# Patient Record
Sex: FEMALE | Race: OTHER | NOT HISPANIC OR LATINO
[De-identification: names, ages, dates, MRNs, and addresses within clinical notes are randomized per-mention and may not be internally consistent; named-entity substitution may affect disease eponyms.]

---

## 2022-04-11 PROBLEM — Z00.00 ENCOUNTER FOR PREVENTIVE HEALTH EXAMINATION: Status: ACTIVE | Noted: 2022-04-11

## 2022-04-19 ENCOUNTER — APPOINTMENT (OUTPATIENT)
Dept: OBGYN | Facility: CLINIC | Age: 47
End: 2022-04-19
Payer: COMMERCIAL

## 2022-04-19 VITALS
WEIGHT: 218 LBS | DIASTOLIC BLOOD PRESSURE: 90 MMHG | HEIGHT: 66 IN | BODY MASS INDEX: 35.03 KG/M2 | SYSTOLIC BLOOD PRESSURE: 136 MMHG

## 2022-04-19 DIAGNOSIS — Z83.3 FAMILY HISTORY OF DIABETES MELLITUS: ICD-10-CM

## 2022-04-19 DIAGNOSIS — Z78.9 OTHER SPECIFIED HEALTH STATUS: ICD-10-CM

## 2022-04-19 DIAGNOSIS — Z80.1 FAMILY HISTORY OF MALIGNANT NEOPLASM OF TRACHEA, BRONCHUS AND LUNG: ICD-10-CM

## 2022-04-19 DIAGNOSIS — Z82.49 FAMILY HISTORY OF ISCHEMIC HEART DISEASE AND OTHER DISEASES OF THE CIRCULATORY SYSTEM: ICD-10-CM

## 2022-04-19 PROCEDURE — 99072 ADDL SUPL MATRL&STAF TM PHE: CPT

## 2022-04-19 PROCEDURE — 99386 PREV VISIT NEW AGE 40-64: CPT

## 2022-04-19 NOTE — HISTORY OF PRESENT ILLNESS
[FreeTextEntry1] : Patient is 47 years old para 2-0-0-2 last menstrual period April 1 2022 x 5 days.\par She notes regular menstrual periods every month and is present without complaints.

## 2022-04-19 NOTE — DISCUSSION/SUMMARY
[FreeTextEntry1] : Pap, gonorrhea and Chlamydia test done\par Self breast exam stressed\par Prescribed bilateral diagnostic mammogram and bilateral breast ultrasound\par Follow-up yearly or as needed

## 2022-05-23 ENCOUNTER — APPOINTMENT (OUTPATIENT)
Dept: OTOLARYNGOLOGY | Facility: CLINIC | Age: 47
End: 2022-05-23
Payer: COMMERCIAL

## 2022-05-23 VITALS — BODY MASS INDEX: 34.55 KG/M2 | WEIGHT: 215 LBS | HEIGHT: 66 IN

## 2022-05-23 DIAGNOSIS — R07.0 PAIN IN THROAT: ICD-10-CM

## 2022-05-23 DIAGNOSIS — R22.1 LOCALIZED SWELLING, MASS AND LUMP, NECK: ICD-10-CM

## 2022-05-23 DIAGNOSIS — K21.9 GASTRO-ESOPHAGEAL REFLUX DISEASE W/OUT ESOPHAGITIS: ICD-10-CM

## 2022-05-23 PROCEDURE — 31575 DIAGNOSTIC LARYNGOSCOPY: CPT

## 2022-05-23 PROCEDURE — 99204 OFFICE O/P NEW MOD 45 MIN: CPT | Mod: 25

## 2022-05-23 PROCEDURE — 99072 ADDL SUPL MATRL&STAF TM PHE: CPT

## 2022-05-23 RX ORDER — PANTOPRAZOLE 40 MG/1
40 TABLET, DELAYED RELEASE ORAL
Qty: 30 | Refills: 3 | Status: ACTIVE | COMMUNITY
Start: 2022-05-23 | End: 1900-01-01

## 2022-05-23 RX ORDER — FAMOTIDINE 40 MG/1
40 TABLET, FILM COATED ORAL
Qty: 30 | Refills: 3 | Status: ACTIVE | COMMUNITY
Start: 2022-05-23 | End: 1900-01-01

## 2022-05-23 NOTE — HISTORY OF PRESENT ILLNESS
[de-identified] : Patient presents today c/o throat pain ,seen PCP for throat  pain referred to ENT  . She did have sonogram performed , feels a mass behind neck  .  No  discomfort  when swallowing.   Has been having throat pain  since 2020, on and off .

## 2022-05-23 NOTE — PHYSICAL EXAM
[Normal] : no abnormal secretions [de-identified] : palpable mass soft non tender supraclavicular area at left

## 2022-05-23 NOTE — PROCEDURE
[None] : none [Flexible Endoscope] : examined with the flexible endoscope [True Vocal Cords Erythematous] : bilateral true vocal cord edema [Glottis Arytenoid Cartilages Erythema] : bilateral arytenoid ~M erythema [Arytenoid Erythema ___ /4] : arytenoid erythema [unfilled]U/4 [Normal] : posterior cricoid area had healthy pink mucosa in the interarytenoid area and the esophageal inlet [Interarytenoid Edema] : interarytenoid area edematous

## 2023-05-15 ENCOUNTER — APPOINTMENT (OUTPATIENT)
Dept: OBGYN | Facility: CLINIC | Age: 48
End: 2023-05-15
Payer: COMMERCIAL

## 2023-05-15 VITALS
SYSTOLIC BLOOD PRESSURE: 122 MMHG | HEIGHT: 66 IN | BODY MASS INDEX: 34.23 KG/M2 | WEIGHT: 213 LBS | DIASTOLIC BLOOD PRESSURE: 76 MMHG

## 2023-05-15 DIAGNOSIS — Z01.419 ENCOUNTER FOR GYNECOLOGICAL EXAMINATION (GENERAL) (ROUTINE) W/OUT ABNORMAL FINDINGS: ICD-10-CM

## 2023-05-15 PROCEDURE — 99396 PREV VISIT EST AGE 40-64: CPT

## 2023-05-15 NOTE — HISTORY OF PRESENT ILLNESS
[FreeTextEntry1] : Patient is 48 years old para 2-0-0-2 last menstrual period May 8, 2023.\par She notes regular menstrual periods every month and is present without complaints

## 2023-11-28 ENCOUNTER — APPOINTMENT (OUTPATIENT)
Dept: ORTHOPEDIC SURGERY | Facility: CLINIC | Age: 48
End: 2023-11-28
Payer: COMMERCIAL

## 2023-11-28 PROCEDURE — 99203 OFFICE O/P NEW LOW 30 MIN: CPT

## 2023-11-28 RX ORDER — NAPROXEN 500 MG/1
500 TABLET ORAL
Qty: 60 | Refills: 2 | Status: ACTIVE | COMMUNITY
Start: 2023-11-28 | End: 1900-01-01

## 2024-02-01 ENCOUNTER — RX RENEWAL (OUTPATIENT)
Age: 49
End: 2024-02-01

## 2024-02-01 RX ORDER — FAMOTIDINE 20 MG/1
20 TABLET, FILM COATED ORAL
Qty: 60 | Refills: 3 | Status: ACTIVE | COMMUNITY
Start: 2023-11-28 | End: 1900-01-01

## 2024-02-06 ENCOUNTER — APPOINTMENT (OUTPATIENT)
Dept: ORTHOPEDIC SURGERY | Facility: CLINIC | Age: 49
End: 2024-02-06

## 2024-02-20 ENCOUNTER — APPOINTMENT (OUTPATIENT)
Dept: GASTROENTEROLOGY | Facility: CLINIC | Age: 49
End: 2024-02-20

## 2024-04-18 ENCOUNTER — APPOINTMENT (OUTPATIENT)
Dept: ORTHOPEDIC SURGERY | Facility: CLINIC | Age: 49
End: 2024-04-18
Payer: MEDICAID

## 2024-04-18 DIAGNOSIS — G89.29 PAIN IN LEFT KNEE: ICD-10-CM

## 2024-04-18 DIAGNOSIS — M25.562 PAIN IN LEFT KNEE: ICD-10-CM

## 2024-04-18 PROCEDURE — 99213 OFFICE O/P EST LOW 20 MIN: CPT

## 2024-04-18 RX ORDER — FAMOTIDINE 20 MG/1
20 TABLET, FILM COATED ORAL
Qty: 60 | Refills: 2 | Status: ACTIVE | COMMUNITY
Start: 2024-04-18 | End: 1900-01-01

## 2024-04-18 RX ORDER — NAPROXEN 500 MG/1
500 TABLET ORAL
Qty: 60 | Refills: 1 | Status: ACTIVE | COMMUNITY
Start: 2024-04-18 | End: 1900-01-01

## 2024-04-18 NOTE — ASSESSMENT
[FreeTextEntry1] : 49-year-old woman with patellofemoral arthritis and patella tilt.  I do think that she should complete a course of physical therapy.  She can continue with the Cisneros taping.  I have renewed a prescription for Naprosyn and famotidine.  NSAID precautions discussed.  In addition to these strategies we can consider hyaluronic acid injection.  She is amenable.  Will seek approval.  Will have her follow-up in 3 months time after she completed course of physical therapy.  New PT prescription provided.  Instructions on terminal extension quadricep strengthening were also reviewed with the patient.  All questions answered to her satisfaction she agrees with the plan.

## 2024-04-18 NOTE — HISTORY OF PRESENT ILLNESS
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  Prescribed physical therapy she had some insurance issues and was unable to complete the therapy.  She has been using Kinesiotape which she finds helpful.  She still has pain 2-3 times a week for which she takes Naprosyn with famotidine.  This is medicated but not alleviated the pain.  She wonders what more can be done for her knee pain.  Walks without a cane or assistive devices.  No history of giving way or locking.

## 2024-04-18 NOTE — IMAGING
[de-identified] : Pleasant early middle-aged woman walks into my office no distress.  She is able to get onto and subsequently off of the exam table without assistance.  Physical examination: Left knee: Abnormal patellofemoral grind test.  She has a slight patella tilt.  J sign is much less pronounced.  No joint line tenderness between the femoral-tibial joint.  Moderate synovial thickening.  No effusion.  No varus valgus instability.  No geniculate lymph nodes or masses.  Calf soft no cords.

## 2024-06-24 ENCOUNTER — APPOINTMENT (OUTPATIENT)
Dept: OBGYN | Facility: CLINIC | Age: 49
End: 2024-06-24
Payer: MEDICAID

## 2024-06-24 VITALS
WEIGHT: 195 LBS | BODY MASS INDEX: 31.34 KG/M2 | HEIGHT: 66 IN | DIASTOLIC BLOOD PRESSURE: 81 MMHG | SYSTOLIC BLOOD PRESSURE: 127 MMHG

## 2024-06-24 DIAGNOSIS — N95.2 POSTMENOPAUSAL ATROPHIC VAGINITIS: ICD-10-CM

## 2024-06-24 DIAGNOSIS — Z01.411 ENCOUNTER FOR GYNECOLOGICAL EXAMINATION (GENERAL) (ROUTINE) WITH ABNORMAL FINDINGS: ICD-10-CM

## 2024-06-24 DIAGNOSIS — N84.1 POLYP OF CERVIX UTERI: ICD-10-CM

## 2024-06-24 PROCEDURE — 99213 OFFICE O/P EST LOW 20 MIN: CPT | Mod: 25

## 2024-06-24 PROCEDURE — 99396 PREV VISIT EST AGE 40-64: CPT

## 2024-06-24 PROCEDURE — 57500 BIOPSY OF CERVIX: CPT

## 2024-07-16 ENCOUNTER — APPOINTMENT (OUTPATIENT)
Dept: ORTHOPEDIC SURGERY | Facility: CLINIC | Age: 49
End: 2024-07-16

## 2024-07-23 DIAGNOSIS — Z80.0 FAMILY HISTORY OF MALIGNANT NEOPLASM OF DIGESTIVE ORGANS: ICD-10-CM

## 2024-07-23 DIAGNOSIS — Z12.11 ENCOUNTER FOR SCREENING FOR MALIGNANT NEOPLASM OF COLON: ICD-10-CM

## 2024-08-06 ENCOUNTER — APPOINTMENT (OUTPATIENT)
Dept: GASTROENTEROLOGY | Facility: CLINIC | Age: 49
End: 2024-08-06

## 2024-08-06 PROBLEM — Z12.11 COLON CANCER SCREENING: Status: ACTIVE | Noted: 2024-08-06

## 2024-08-06 PROCEDURE — 99204 OFFICE O/P NEW MOD 45 MIN: CPT

## 2024-08-06 NOTE — PHYSICAL EXAM
[Normal] : the appearance was normal, no neck mass [No Respiratory Distress] : no respiratory distress [Respiration, Rhythm And Depth] : normal respiratory rhythm and effort [Auscultation Breath Sounds / Voice Sounds] : lungs were clear to auscultation bilaterally [Heart Rate And Rhythm] : heart rate was normal and rhythm regular [Normal S1, S2] : normal S1 and S2 [Bowel Sounds] : normal bowel sounds [Murmurs] : no murmurs [Abdomen Tenderness] : non-tender [No Masses] : no abdominal mass palpated [Abdomen Soft] : soft [Abnormal Walk] : normal gait [Oriented To Time, Place, And Person] : oriented to person, place, and time

## 2024-08-06 NOTE — HISTORY OF PRESENT ILLNESS
[FreeTextEntry1] : 49yoF average risk for CRC. Patient has no significant medical history. Presents today for CRC screening.   Pt denies abd pain, no nausea or vomiting, no dysphagia or odynophagia, no heartburn, no unintentional weight loss or appetite changes, no diarrhea, no constipation or changes in BMs including hematochezia or melena, no coffee ground emesis or hematemesis. States she has a BM 1x/day with no straining.

## 2024-08-06 NOTE — ASSESSMENT
[FreeTextEntry1] : 49yoF average risk for CRC. Patient has no significant medical history. Presents today for CRC screening.   CRC Screening -Schedule colonoscopy -educated on the generic suprep and dulcolax -risks vs. benefits discussed

## 2024-08-06 NOTE — PHYSICAL EXAM
[Normal] : the appearance was normal, no neck mass [No Respiratory Distress] : no respiratory distress [Respiration, Rhythm And Depth] : normal respiratory rhythm and effort [Auscultation Breath Sounds / Voice Sounds] : lungs were clear to auscultation bilaterally [Heart Rate And Rhythm] : heart rate was normal and rhythm regular [Normal S1, S2] : normal S1 and S2 [Murmurs] : no murmurs [Bowel Sounds] : normal bowel sounds [Abdomen Tenderness] : non-tender [No Masses] : no abdominal mass palpated [Abdomen Soft] : soft [Abnormal Walk] : normal gait [Oriented To Time, Place, And Person] : oriented to person, place, and time

## 2024-08-13 ENCOUNTER — RX RENEWAL (OUTPATIENT)
Age: 49
End: 2024-08-13

## 2025-06-25 ENCOUNTER — TRANSCRIPTION ENCOUNTER (OUTPATIENT)
Age: 50
End: 2025-06-25

## 2025-06-25 ENCOUNTER — RESULT REVIEW (OUTPATIENT)
Age: 50
End: 2025-06-25

## 2025-06-25 ENCOUNTER — OUTPATIENT (OUTPATIENT)
Dept: OUTPATIENT SERVICES | Facility: HOSPITAL | Age: 50
LOS: 1 days | Discharge: HOME | End: 2025-06-25
Payer: COMMERCIAL

## 2025-06-25 VITALS
WEIGHT: 195.11 LBS | RESPIRATION RATE: 17 BRPM | TEMPERATURE: 98 F | SYSTOLIC BLOOD PRESSURE: 137 MMHG | OXYGEN SATURATION: 100 % | HEART RATE: 75 BPM | HEIGHT: 65 IN | DIASTOLIC BLOOD PRESSURE: 74 MMHG

## 2025-06-25 VITALS — HEART RATE: 62 BPM | SYSTOLIC BLOOD PRESSURE: 140 MMHG | RESPIRATION RATE: 16 BRPM | DIASTOLIC BLOOD PRESSURE: 63 MMHG

## 2025-06-25 DIAGNOSIS — Z12.11 ENCOUNTER FOR SCREENING FOR MALIGNANT NEOPLASM OF COLON: ICD-10-CM

## 2025-06-25 PROCEDURE — 81025 URINE PREGNANCY TEST: CPT

## 2025-06-25 PROCEDURE — 88305 TISSUE EXAM BY PATHOLOGIST: CPT

## 2025-06-25 PROCEDURE — 88305 TISSUE EXAM BY PATHOLOGIST: CPT | Mod: 26

## 2025-06-25 PROCEDURE — 45380 COLONOSCOPY AND BIOPSY: CPT

## 2025-06-25 NOTE — ASU PATIENT PROFILE, ADULT - FALL HARM RISK - HARM RISK INTERVENTIONS

## 2025-06-25 NOTE — ASU DISCHARGE PLAN (ADULT/PEDIATRIC) - FINANCIAL ASSISTANCE
[facility] provides services at a reduced cost to those who are determined to be eligible through [facility]’s financial assistance program. Information regarding [facility]’s financial assistance program can be found by going to [email or web address] or by calling [phone].

## 2025-06-25 NOTE — PRE-ANESTHESIA EVALUATION ADULT - NSANTHOSAYNRD_GEN_A_CORE
No. TITUS screening performed.  STOP BANG Legend: 0-2 = LOW Risk; 3-4 = INTERMEDIATE Risk; 5-8 = HIGH Risk

## 2025-06-26 PROBLEM — Z78.9 OTHER SPECIFIED HEALTH STATUS: Chronic | Status: ACTIVE | Noted: 2025-06-25

## 2025-06-27 LAB — SURGICAL PATHOLOGY STUDY: SIGNIFICANT CHANGE UP

## 2025-07-02 DIAGNOSIS — K57.30 DIVERTICULOSIS OF LARGE INTESTINE WITHOUT PERFORATION OR ABSCESS WITHOUT BLEEDING: ICD-10-CM

## 2025-07-02 DIAGNOSIS — K62.1 RECTAL POLYP: ICD-10-CM

## 2025-07-02 DIAGNOSIS — K64.4 RESIDUAL HEMORRHOIDAL SKIN TAGS: ICD-10-CM

## 2025-07-02 DIAGNOSIS — Z12.11 ENCOUNTER FOR SCREENING FOR MALIGNANT NEOPLASM OF COLON: ICD-10-CM

## 2025-07-10 ENCOUNTER — APPOINTMENT (OUTPATIENT)
Dept: OBGYN | Facility: CLINIC | Age: 50
End: 2025-07-10
Payer: COMMERCIAL

## 2025-07-10 VITALS
BODY MASS INDEX: 31.82 KG/M2 | DIASTOLIC BLOOD PRESSURE: 75 MMHG | WEIGHT: 198 LBS | HEIGHT: 66 IN | SYSTOLIC BLOOD PRESSURE: 140 MMHG

## 2025-07-10 PROCEDURE — 99459 PELVIC EXAMINATION: CPT | Mod: NC

## 2025-07-10 PROCEDURE — 99396 PREV VISIT EST AGE 40-64: CPT | Mod: 25

## 2025-07-29 ENCOUNTER — EMERGENCY (EMERGENCY)
Facility: HOSPITAL | Age: 50
LOS: 0 days | Discharge: HOME | End: 2025-07-29
Attending: EMERGENCY MEDICINE
Payer: COMMERCIAL

## 2025-07-29 VITALS
TEMPERATURE: 99 F | HEART RATE: 80 BPM | SYSTOLIC BLOOD PRESSURE: 188 MMHG | RESPIRATION RATE: 18 BRPM | OXYGEN SATURATION: 100 % | HEIGHT: 65 IN | DIASTOLIC BLOOD PRESSURE: 80 MMHG | WEIGHT: 199.96 LBS

## 2025-07-29 DIAGNOSIS — M54.30 SCIATICA, UNSPECIFIED SIDE: ICD-10-CM

## 2025-07-29 DIAGNOSIS — M25.551 PAIN IN RIGHT HIP: ICD-10-CM

## 2025-07-29 PROCEDURE — 96374 THER/PROPH/DIAG INJ IV PUSH: CPT

## 2025-07-29 PROCEDURE — 96375 TX/PRO/DX INJ NEW DRUG ADDON: CPT

## 2025-07-29 PROCEDURE — 99283 EMERGENCY DEPT VISIT LOW MDM: CPT

## 2025-07-29 PROCEDURE — 99284 EMERGENCY DEPT VISIT MOD MDM: CPT | Mod: 25

## 2025-07-29 RX ORDER — ACETAMINOPHEN 500 MG/5ML
2 LIQUID (ML) ORAL
Qty: 42 | Refills: 0
Start: 2025-07-29 | End: 2025-08-04

## 2025-07-29 RX ORDER — TIZANIDINE 4 MG/1
1 TABLET ORAL
Qty: 21 | Refills: 0
Start: 2025-07-29 | End: 2025-08-04

## 2025-07-29 RX ORDER — DEXAMETHASONE 0.5 MG/1
10 TABLET ORAL ONCE
Refills: 0 | Status: COMPLETED | OUTPATIENT
Start: 2025-07-29 | End: 2025-07-29

## 2025-07-29 RX ORDER — KETOROLAC TROMETHAMINE 30 MG/ML
30 INJECTION, SOLUTION INTRAMUSCULAR; INTRAVENOUS ONCE
Refills: 0 | Status: DISCONTINUED | OUTPATIENT
Start: 2025-07-29 | End: 2025-07-29

## 2025-07-29 RX ORDER — IBUPROFEN 200 MG
1 TABLET ORAL
Qty: 21 | Refills: 0
Start: 2025-07-29 | End: 2025-08-04

## 2025-07-29 RX ADMIN — KETOROLAC TROMETHAMINE 30 MILLIGRAM(S): 30 INJECTION, SOLUTION INTRAMUSCULAR; INTRAVENOUS at 13:45

## 2025-07-29 RX ADMIN — DEXAMETHASONE 10 MILLIGRAM(S): 0.5 TABLET ORAL at 13:45

## 2025-07-29 NOTE — ED PROVIDER NOTE - NSFOLLOWUPINSTRUCTIONS_ED_ALL_ED_FT
Sciatica    WHAT YOU NEED TO KNOW:    Sciatica is a condition that causes pain along your sciatic nerve. The sciatic nerve runs from your spine through both sides of your buttocks. It then runs down the back of your thigh, into your lower leg and foot. Your sciatic nerve may be compressed, inflamed, irritated, or stretched.      DISCHARGE INSTRUCTIONS:    Medicines:     NSAIDs: These medicines decrease swelling and pain. NSAIDs are available without a doctor's order. Ask your healthcare provider which medicine is right for you. Ask how much to take and when to take it. Take as directed. NSAIDs can cause stomach bleeding or kidney problems if not taken correctly.    Acetaminophen: This medicine decreases pain. Acetaminophen is available without a doctor's order. Ask how much to take and when to take it. Follow directions. Acetaminophen can cause liver damage if not taken correctly.    Muscle relaxers help decrease pain and muscle spasms.    Take your medicine as directed. Contact your healthcare provider if you think your medicine is not helping or if you have side effects. Tell him of her if you are allergic to any medicine. Keep a list of the medicines, vitamins, and herbs you take. Include the amounts, and when and why you take them. Bring the list or the pill bottles to follow-up visits. Carry your medicine list with you in case of an emergency.    Follow up with your healthcare provider as directed: Write down your questions so you remember to ask them during your visits.     Manage your symptoms:   Activity: Decrease your activity. Do not lift heavy objects or twist your back for at least 6 weeks. Slowly return to your usual activity.  Ice: Ice helps decrease swelling and pain. Ice may also help prevent tissue damage. Use an ice pack, or put crushed ice in a plastic bag. Cover it with a towel and place it on your low back or leg for 15 to 20 minutes every hour or as directed.  Heat: Heat helps decrease pain and muscle spasms. Apply heat on the area for 20 to 30 minutes every 2 hours for as many days as directed.   Physical therapy: You may need to see physical therapist to teach you exercises to help improve movement and strength, and to decrease pain. An occupational therapist teaches you skills to help with your daily activities.   Use assistive devices if directed: You may need to wear back support, such as a back brace. You may need crutches, a cane, or a walker to decrease stress on your lower back and leg muscles. Ask your healthcare provider for more information about assistive devices and how to use them correctly.    Self-care:   Avoid pressure on your back and legs: Do not lift heavy objects, or stand or sit for long periods of time.  Lift objects safely: Keep your back straight and bend your knees when you  an object. Do not bend or twist your back when you lift.  Maintain a healthy weight: Ask your healthcare provider how much you should weigh. Ask him to help you create a weight loss plan if you are overweight.   Exercise: Ask your healthcare provider about the best stretching, warmup, and exercise plan for you.     Contact your healthcare provider if:   You have pain in your lower back at night or when resting.  You have pain in your lower back with numbness below the knee.  You have weakness in one leg only.  You have questions or concerns about your condition or care.    Return to the emergency department if:   You have trouble holding back your urine or bowel movements.  You have weakness in both legs.  You have numbness in your groin or buttocks.

## 2025-07-29 NOTE — ED PROVIDER NOTE - PATIENT PORTAL LINK FT
You can access the FollowMyHealth Patient Portal offered by  by registering at the following website: [email or web address]. By joining [facility]’s FollowMyHealth portal, you will also be able to view your health information using other applications (apps) compatible with our system.

## 2025-07-29 NOTE — ED PROVIDER NOTE - NSPTACCESSSVCSAPPTDETAILS_ED_ALL_ED_FT
Patient with sciatica.  May need MRI and further workup and treatment.  Please arrange for outpatient follow up.

## 2025-07-29 NOTE — ED PROVIDER NOTE - PHYSICAL EXAMINATION
VITAL SIGNS: I have reviewed nursing notes and confirm.  CONSTITUTIONAL: well-appearing, non-toxic, NAD  SKIN: Warm dry, normal skin turgor  HEAD: NCAT  EYES: EOMI, PERRLA, no scleral icterus  ENT: Moist mucous membranes,   NECK: Supple; non tender. Full ROM. No cervical LAD  CARD: RRR  RESP: clear to ausculation b/l.    ABD: soft, + BS, non-tender, non-distended  EXT: +TTP to R. buttocks, R posterior thigh. Full ROM, no bony tenderness, no pedal edema, no calf tenderness  NEURO: normal motor. normal sensory.

## 2025-07-29 NOTE — ED PROVIDER NOTE - OBJECTIVE STATEMENT
50y F with no PMHx presents today for right lower extremity pain. Patient describes the pain as burning from her right buttock down her right leg posteriorly to her foot. Patient states pain began yesterday she woke up from sleep.  Patient states she was not exerting herself no heavy lifting.  Patient states she tried to use a massage ball with no relief.  She states she took Tylenol and 500 mg naproxen with no relief.  Patient denies chest pain, shortness of breath, numbness, tingling, urinary symptoms, fevers, chills.

## 2025-07-29 NOTE — ED PROVIDER NOTE - CLINICAL SUMMARY MEDICAL DECISION MAKING FREE TEXT BOX
Patient with signs and symptoms of sciatica.  Medicated with good improvement.  Outpatient follow up with spine surgery to be arranged.  stable for discharge and outpatient follow up.  no redflag symptoms.

## 2025-08-20 ENCOUNTER — APPOINTMENT (OUTPATIENT)
Facility: CLINIC | Age: 50
End: 2025-08-20
Payer: COMMERCIAL

## 2025-08-20 DIAGNOSIS — M54.16 RADICULOPATHY, LUMBAR REGION: ICD-10-CM

## 2025-08-20 PROCEDURE — 72110 X-RAY EXAM L-2 SPINE 4/>VWS: CPT

## 2025-08-20 PROCEDURE — 99204 OFFICE O/P NEW MOD 45 MIN: CPT

## 2025-08-20 RX ORDER — MELOXICAM 15 MG/1
15 TABLET ORAL
Qty: 30 | Refills: 2 | Status: ACTIVE | COMMUNITY
Start: 2025-08-20 | End: 1900-01-01

## 2025-08-20 RX ORDER — METHYLPREDNISOLONE 4 MG/1
4 TABLET ORAL
Qty: 1 | Refills: 0 | Status: ACTIVE | COMMUNITY
Start: 2025-08-20 | End: 1900-01-01

## 2025-08-25 ENCOUNTER — APPOINTMENT (OUTPATIENT)
Age: 50
End: 2025-08-25
Payer: COMMERCIAL

## 2025-08-25 VITALS
SYSTOLIC BLOOD PRESSURE: 150 MMHG | HEART RATE: 81 BPM | WEIGHT: 202.25 LBS | HEIGHT: 65.5 IN | BODY MASS INDEX: 33.29 KG/M2 | TEMPERATURE: 98.1 F | OXYGEN SATURATION: 100 % | RESPIRATION RATE: 81 BRPM | DIASTOLIC BLOOD PRESSURE: 80 MMHG

## 2025-08-25 DIAGNOSIS — D50.9 IRON DEFICIENCY ANEMIA, UNSPECIFIED: ICD-10-CM

## 2025-08-25 DIAGNOSIS — D64.9 ANEMIA, UNSPECIFIED: ICD-10-CM

## 2025-08-25 DIAGNOSIS — Z79.899 OTHER LONG TERM (CURRENT) DRUG THERAPY: ICD-10-CM

## 2025-08-25 PROCEDURE — 99204 OFFICE O/P NEW MOD 45 MIN: CPT

## 2025-08-27 PROBLEM — D50.9 IRON DEFICIENCY ANEMIA: Status: ACTIVE | Noted: 2025-08-27

## 2025-08-27 PROBLEM — Z79.899 ENCOUNTER FOR MEDICATION MANAGEMENT: Status: ACTIVE | Noted: 2025-08-27

## 2025-08-27 PROBLEM — D64.9 ANEMIA: Status: ACTIVE | Noted: 2025-08-27

## 2025-08-27 LAB
AUTO BASOPHILS #: 0.05 K/UL
AUTO BASOPHILS %: 0.5 %
AUTO EOSINOPHILS #: 0.01 K/UL
AUTO EOSINOPHILS %: 0.1 %
AUTO IMMATURE GRANULOCYTES #: 0.01 K/UL
AUTO LYMPHOCYTES #: 1.47 K/UL
AUTO LYMPHOCYTES %: 16 %
AUTO MONOCYTES #: 0.49 K/UL
AUTO MONOCYTES %: 5.3 %
AUTO NEUTROPHILS #: 7.18 K/UL
AUTO NEUTROPHILS %: 78 %
AUTO NRBC #: 0 K/UL
FERRITIN SERPL-MCNC: 6 NG/ML
FOLATE SERPL-MCNC: 10.5 NG/ML
HCT VFR BLD CALC: 31.7 %
HGB BLD-MCNC: 9.6 G/DL
IMM GRANULOCYTES NFR BLD AUTO: 0.1 %
IRON SATN MFR SERPL: 4 %
IRON SERPL-MCNC: 18 UG/DL
MAN DIFF?: NORMAL
MCHC RBC-ENTMCNC: 22.5 PG
MCHC RBC-ENTMCNC: 30.3 G/DL
MCV RBC AUTO: 74.4 FL
PLATELET # BLD AUTO: 450 K/UL
PMV BLD AUTO: 0 /100 WBCS
PMV BLD: 9.6 FL
RBC # BLD: 4.26 M/UL
RBC # FLD: 17 %
TIBC SERPL-MCNC: 408 UG/DL
UIBC SERPL-MCNC: 390 UG/DL
VIT B12 SERPL-MCNC: 644 PG/ML
WBC # FLD AUTO: 9.21 K/UL

## 2025-08-29 ENCOUNTER — APPOINTMENT (OUTPATIENT)
Age: 50
End: 2025-08-29

## 2025-09-02 LAB — METHYLMALONATE SERPL-SCNC: 109 NMOL/L

## 2025-09-05 ENCOUNTER — APPOINTMENT (OUTPATIENT)
Age: 50
End: 2025-09-05

## 2025-09-12 ENCOUNTER — APPOINTMENT (OUTPATIENT)
Age: 50
End: 2025-09-12

## 2025-09-19 ENCOUNTER — APPOINTMENT (OUTPATIENT)
Age: 50
End: 2025-09-19